# Patient Record
Sex: FEMALE | Race: WHITE | Employment: UNEMPLOYED | URBAN - METROPOLITAN AREA
[De-identification: names, ages, dates, MRNs, and addresses within clinical notes are randomized per-mention and may not be internally consistent; named-entity substitution may affect disease eponyms.]

---

## 2024-05-21 ENCOUNTER — OFFICE VISIT (OUTPATIENT)
Age: 59
End: 2024-05-21
Payer: COMMERCIAL

## 2024-05-21 DIAGNOSIS — R47.9 SPEECH DISTURBANCE, UNSPECIFIED TYPE: ICD-10-CM

## 2024-05-21 DIAGNOSIS — R41.3 MEMORY LOSS: Primary | ICD-10-CM

## 2024-05-21 PROCEDURE — 90785 PSYTX COMPLEX INTERACTIVE: CPT | Performed by: CLINICAL NEUROPSYCHOLOGIST

## 2024-05-21 PROCEDURE — 90791 PSYCH DIAGNOSTIC EVALUATION: CPT | Performed by: CLINICAL NEUROPSYCHOLOGIST

## 2024-05-21 NOTE — PROGRESS NOTES
previously receiving pension from her work due to her disability status but since more recent medical records have documented the patient's impairment is a product of depression/anxiety, her pension payments have been discontinued.  They stated they may be reinstated if her cognitive impairment was found to be neurological in origin.    Substance Use:  Alcohol: None  Nicotine: None  Recreational/Illicit Substances: None  Treatment: None    BEHAVIORAL OBSERVATIONS:  Appearance: Casually dressed, Well-groomed, and Appeared stated age  Orientation: Reported the date to be Monday, 5/23/2024 (correct: Tuesday, 5/21/2024).  She was oriented to person and generally oriented to place.  She was also roughly oriented to circumstance.  Patient had difficulty providing an account of recent events.  Motor: Ambulated independently, Adequate gait, Adequate posture, No involuntary movements, and Adequate strength  Thought Processes: Circumstantial and Tangential  Hearing and Vision: Adequate  Speech: Notable for word finding difficulties and nonfluent speech.  No phonemic paraphasic errors.  Appropriate for Tone and Volume  Comprehension: Fair  Interpersonal Skills: Adequate  Affect: Appropriate  Ability as Historian: Inadequate  Insight: Fair  Judgment: Fair  Other behaviors: The patient showed some evidence of short-term memory deficits by repeatedly attempting to give office staff the same form despite being instructed to provide it to the .  She required repetition of these instructions over very brief periods of time (less than 30 seconds).    STRENGTHS:  Exercising self-direction/Resourceful, Access to housing/residential stability, and Interpersonal/supportive relationships (family, friends, peers)    WEAKNESSES:  Health problems and Cognitive limitations    IMPRESSION:  The patient is a 59-year-old female who presents for neuropsychological evaluation secondary to ongoing concerns regarding her cognitive

## 2024-06-05 ENCOUNTER — TELEPHONE (OUTPATIENT)
Age: 59
End: 2024-06-05

## 2024-06-05 NOTE — TELEPHONE ENCOUNTER
Vianca at [facility] contacted me regarding PA submitted on 6/3/2024. Per Vianca patient's PA was approved for 15635- 1 unit, 98655- 3 units, 36536- 1 unit, 88181- 1 unit, 61507- 7 units. Denied 55979- 1 unit.   Dates of service 6/3/2024 - 11/29/2024  Authorization # [id number]

## 2024-06-17 ENCOUNTER — PROCEDURE VISIT (OUTPATIENT)
Age: 59
End: 2024-06-17

## 2024-06-17 DIAGNOSIS — R41.3 MEMORY LOSS: Primary | ICD-10-CM

## 2024-06-17 DIAGNOSIS — R47.9 SPEECH DISTURBANCE, UNSPECIFIED TYPE: ICD-10-CM

## 2024-06-18 ENCOUNTER — PROCEDURE VISIT (OUTPATIENT)
Age: 59
End: 2024-06-18
Payer: MEDICARE

## 2024-06-18 DIAGNOSIS — F03.90 MAJOR NEUROCOGNITIVE DISORDER (HCC): Primary | ICD-10-CM

## 2024-06-18 PROCEDURE — 96139 PSYCL/NRPSYC TST TECH EA: CPT | Performed by: CLINICAL NEUROPSYCHOLOGIST

## 2024-06-18 PROCEDURE — 96132 NRPSYC TST EVAL PHYS/QHP 1ST: CPT | Performed by: CLINICAL NEUROPSYCHOLOGIST

## 2024-06-18 PROCEDURE — 96138 PSYCL/NRPSYC TECH 1ST: CPT | Performed by: CLINICAL NEUROPSYCHOLOGIST

## 2024-06-18 PROCEDURE — 96133 NRPSYC TST EVAL PHYS/QHP EA: CPT | Performed by: CLINICAL NEUROPSYCHOLOGIST

## 2024-07-01 NOTE — PROGRESS NOTES
practice due to excessive errors  Inhibition/Switching: Not administered due to impairment on preceding subtest  Verbal abstract reasoning: Exceptionally low  Visuospatial:  Basic visual perception: Within normal limits  Pattern reconstruction: Low average  Complex figure copy: Below average.  The patient's reconstruction roughly resembled the target stimulus but she utilized a piecemeal approach resulting in disorganization in detail errors.  Language:  Auditory comprehension: Exceptionally low, notable for difficulties carrying out multistep instructions  Repetition:  Single word: 10/10  Nonsense word: /5  Sentence: 8/10  Oral production: Below average  Vocabulary knowledge: Average  Picture/vocabulary matching: Average  Visual confrontation naming: Low average  Auditory responsive namin/20  Letter fluency: Exceptionally low  Semantic Fluency: Exceptionally low  Reading comprehension: Within normal limits  Writing: Average  Learning   List learning: Exceptionally low  Story learning: Exceptionally low  Basic figure learning: Exceptionally low  Memory:  List recall: Exceptionally low  Story recall: Exceptionally low  Basic figure recall: Exceptionally low  Recognition:  List recognition: Exceptionally low  Story recognition: Within normal limits  Basic figure recognition: Exceptionally low  Psychiatric/Sleep:   Depression: Mild  Anxiety: Mild  Daytime sleepiness: Within normal limits  Sleep quality: Elevated    TIME:  Diagnostic interview: 1100 - 1200 = 60 minutes  Testing by technician day 1: 1236 - 1557 = 201 minutes  Testing by technician day 2: 1238 - 1428 = 110 minutes  Scoring by technician: 53 minutes  Neuropsychological testing evaluation services*: 216 minutes    BILLING:  90791 x 1 Unit  96138 x 1 Unit  96139 x 11 Units  96132 x 1 Unit  96133 x 3 Units    *Neuropsychological testing evaluation services include: Integration of patient data, interpretation of standardized test results and clinical

## 2024-07-01 NOTE — PROGRESS NOTES
Testing session 1/2.  Time spent testin - 1557 = 201 minutes.  Second testing appointment scheduled for tomorrow.  Report will be attached to that encounter.

## 2024-07-02 ENCOUNTER — OFFICE VISIT (OUTPATIENT)
Age: 59
End: 2024-07-02
Payer: MEDICARE

## 2024-07-02 DIAGNOSIS — F03.90 MAJOR NEUROCOGNITIVE DISORDER (HCC): Primary | ICD-10-CM

## 2024-07-02 PROCEDURE — 96132 NRPSYC TST EVAL PHYS/QHP 1ST: CPT | Performed by: CLINICAL NEUROPSYCHOLOGIST

## 2024-07-02 NOTE — PROGRESS NOTES
Prior to seeing the patient for today's visit, I reviewed pertinent records, including the previously completed report, the records in Epic, and any updated visits from other providers since the patient's last visit.    I provided feedback services related to the previously completed report. Attendees included: Patient and mother . Education was provided regarding my diagnostic impressions, and we discussed treatment plan/options. Attendees were provided with the opportunity to ask questions, which were answered to the best of my ability.    We discussed, in detail, the following:  Reviewed findings from evaluation including test results, diagnosis, and suspected contributing factors  Discussed recommendations outlined in report  Answered questions to the best of my ability    The patient needs to:   Follow up with referring provider for ongoing management, Complete driving evaluation, Use practical strategies to compensate for cognitive weaknesses (See handout), Emphasize modifiable risk and protective factors for cognitive functioning (e.g., exercise, diet, cognitive stimulation; see handout), and Access community resources we discussed for additional support (See handout)    The patient had the following reactions to recommendations: Patient and mother reported understanding results and recommendations.  They requested a copy of the report and we will send one out in the mail upon its completion.    MENTAL STATUS:  Appearance: Casually dressed, Well-groomed, and Appeared stated age  Orientation:  Roughly oriented to time, place, and circumstance.  She was oriented to person.  Motor: Ambulated independently, Adequate gait, Adequate posture, No involuntary movements, and Adequate strength  Thought Processes: Circumstantial and Tangential  Hearing and Vision: Adequate  Speech: notable for word finding difficulties, mostly brief utterances, and nonfluent speech  Comprehension: Fair  Interpersonal Skills:

## 2024-09-16 ENCOUNTER — TELEPHONE (OUTPATIENT)
Age: 59
End: 2024-09-16

## 2024-09-16 NOTE — TELEPHONE ENCOUNTER
HIPAA Verified (if caller is someone other than patient): no       Reason for Call:     If calling to cancel, does patient want to reschedule?   no    Is this call related to results?   no    If yes, please let patient know they must wait for their follow up / feedback appointment to discuss.  They can, however,  a copy of the results at the clinic 2-3 weeks after their testing appt.     Is this a New Patient Appointment Request?   no    If yes:   Requested Provider (choose all that apply - patient doesn't need to call ALL locations):   Subhash    Referred By:      Referral in chart?   N/A    Patient's Insurance Carrier (please ensure you gather insurance information):   [employer]    Additional notes / reason for call:   Patients veronika Chakraborty called stating that [facility] would need another referral in order to schedule for pts diving test. Mom can be reached at [phone]      **Please advise callers that it could take up to 5 business days for a return call**        Message: (any additional details from patient/caller not covered above)          Level 1 Calls - attempted to reach practice?      Reason Call Marked High Priority (if applicable):

## 2024-09-17 NOTE — TELEPHONE ENCOUNTER
Returned call to the patient's mother. She states the patient completed testing with [facility] and now needs to complete testing with [facility]. She is requesting a referral to a driving specialists. Per provider a referral is not needed. A list of  rehabilitation specialist has been sent to the address on file.

## 2024-10-29 ENCOUNTER — TELEPHONE (OUTPATIENT)
Age: 59
End: 2024-10-29

## 2024-10-29 NOTE — TELEPHONE ENCOUNTER
HIPAA Verified (if caller is someone other than patient): N/A       Reason for Call:       If calling to cancel, does patient want to reschedule?   no    Is this call related to results?   no    If yes, please let patient know they must wait for their follow up / feedback appointment to discuss.  They can, however,  a copy of the results at the clinic 2-3 weeks after their testing appt.     Is this a New Patient Appointment Request?   {YES/NO/NA:19705}    If yes:   Requested Provider (choose all that apply - patient doesn't need to call ALL locations):   {[facility] Providers:88047}    Referred By:  ***    Referral in chart?   {YES/NO/NA:19705}    Patient's Insurance Carrier (please ensure you gather insurance information):   ***    Additional notes / reason for call:   ***      **Please advise callers that it could take up to 5 business days for a return call**        Message: (any additional details from patient/caller not covered above)  ***        Level 1 Calls - attempted to reach practice? {YES/NO/NA:57944}     Reason Call Marked High Priority (if applicable):

## 2024-10-29 NOTE — TELEPHONE ENCOUNTER
Pablo Escobar  Office called to request copy of patients testing results form 6/17 and 6/18.   Office [phone]  Fax [phone]

## 2024-10-30 NOTE — TELEPHONE ENCOUNTER
Carlo with [facility]  Office calling to ask for any and all medical records we have for neuropsych from June 18, 2024 to present.     He stated they already went through [employer] for medical records and they did receive some, but not June 18 to present.    PHONE    [phone] or

## 2024-10-31 NOTE — TELEPHONE ENCOUNTER
A copy of the patient's report has been sent to Pablo Escobar [facility] as requested.